# Patient Record
Sex: MALE | Race: WHITE
[De-identification: names, ages, dates, MRNs, and addresses within clinical notes are randomized per-mention and may not be internally consistent; named-entity substitution may affect disease eponyms.]

---

## 2022-01-19 ENCOUNTER — HOSPITAL ENCOUNTER (EMERGENCY)
Dept: HOSPITAL 63 - ER | Age: 57
Discharge: ANOTHER HOSPITAL | End: 2022-01-19
Payer: COMMERCIAL

## 2022-01-19 VITALS — SYSTOLIC BLOOD PRESSURE: 129 MMHG | DIASTOLIC BLOOD PRESSURE: 75 MMHG

## 2022-01-19 VITALS — BODY MASS INDEX: 40.48 KG/M2 | WEIGHT: 257.94 LBS | HEIGHT: 67 IN

## 2022-01-19 DIAGNOSIS — G93.41: ICD-10-CM

## 2022-01-19 DIAGNOSIS — Z88.8: ICD-10-CM

## 2022-01-19 DIAGNOSIS — G89.29: ICD-10-CM

## 2022-01-19 DIAGNOSIS — Z88.5: ICD-10-CM

## 2022-01-19 DIAGNOSIS — K52.9: Primary | ICD-10-CM

## 2022-01-19 LAB
ACETAMIN: < 2 MCG/ML (ref 10–30)
ALBUMIN SERPL-MCNC: 2.9 G/DL (ref 3.4–5)
ALP SERPL-CCNC: 128 U/L (ref 46–116)
ALT SERPL-CCNC: 31 U/L (ref 16–63)
AMPHETAMINE/METHAMPHETAMINE: (no result)
ANION GAP SERPL CALC-SCNC: 8 MMOL/L (ref 6–14)
APTT PPP: YELLOW S
AST SERPL-CCNC: 33 U/L (ref 15–37)
BACTERIA #/AREA URNS HPF: 0 /HPF
BARBITURATES UR-MCNC: (no result) UG/ML
BASOPHILS # BLD AUTO: 0 X10^3/UL (ref 0–0.2)
BASOPHILS NFR BLD: 1 % (ref 0–3)
BENZODIAZ UR-MCNC: (no result) UG/L
BILIRUB DIRECT SERPL-MCNC: 0.3 MG/DL (ref 0–0.2)
BILIRUB SERPL-MCNC: 0.9 MG/DL (ref 0.2–1)
BILIRUB UR QL STRIP: (no result)
CA-I SERPL ISE-MCNC: 13 MG/DL (ref 8–26)
CALCIUM SERPL-MCNC: 8.6 MG/DL (ref 8.5–10.1)
CANNABINOIDS UR-MCNC: (no result) UG/L
CHLORIDE SERPL-SCNC: 109 MMOL/L (ref 98–107)
CO2 SERPL-SCNC: 25 MMOL/L (ref 21–32)
COCAINE UR-MCNC: (no result) NG/ML
CREAT SERPL-MCNC: 0.8 MG/DL (ref 0.7–1.3)
EOSINOPHIL NFR BLD: 0.1 X10^3/UL (ref 0–0.7)
EOSINOPHIL NFR BLD: 3 % (ref 0–3)
ERYTHROCYTE [DISTWIDTH] IN BLOOD BY AUTOMATED COUNT: 14.1 % (ref 11.5–14.5)
ETHANOL SERPL-MCNC: < 10 MG/DL (ref 0–10)
FIBRINOGEN PPP-MCNC: (no result) MG/DL
GFR SERPLBLD BASED ON 1.73 SQ M-ARVRAT: 100 ML/MIN
GLUCOSE SERPL-MCNC: 124 MG/DL (ref 70–99)
GLUCOSE UR STRIP-MCNC: (no result) MG/DL
HCT VFR BLD CALC: 38.7 % (ref 39–53)
HGB BLD-MCNC: 12.9 G/DL (ref 13–17.5)
INFLUENZA A PATIENT: NEGATIVE
INFLUENZA B PATIENT: NEGATIVE
LYMPHOCYTES # BLD: 0.6 X10^3/UL (ref 1–4.8)
LYMPHOCYTES NFR BLD AUTO: 19 % (ref 24–48)
MCH RBC QN AUTO: 35 PG (ref 25–35)
MCHC RBC AUTO-ENTMCNC: 33 G/DL (ref 31–37)
MCV RBC AUTO: 105 FL (ref 79–100)
METHADONE SERPL-MCNC: (no result) NG/ML
MONO #: 0.3 X10^3/UL (ref 0–1.1)
MONOCYTES NFR BLD: 10 % (ref 0–9)
NEUT #: 2.3 X10^3UL (ref 1.8–7.7)
NEUTROPHILS NFR BLD AUTO: 68 % (ref 31–73)
NITRITE UR QL STRIP: (no result)
OPIATES UR-MCNC: (no result) NG/ML
PCP SERPL-MCNC: (no result) MG/DL
PLATELET # BLD AUTO: 87 X10^3/UL (ref 140–400)
POTASSIUM SERPL-SCNC: 3.5 MMOL/L (ref 3.5–5.1)
PROT SERPL-MCNC: 6.3 G/DL (ref 6.4–8.2)
RBC # BLD AUTO: 3.7 X10^6/UL (ref 4.3–5.7)
RBC #/AREA URNS HPF: (no result) /HPF (ref 0–2)
SALIC: < 2.8 MG/DL (ref 2.8–20)
SODIUM SERPL-SCNC: 142 MMOL/L (ref 136–145)
SP GR UR STRIP: 1.02
SQUAMOUS #/AREA URNS LPF: (no result) /LPF
UROBILINOGEN UR-MCNC: >=8 MG/DL
WBC # BLD AUTO: 3.4 X10^3/UL (ref 4–11)
WBC #/AREA URNS HPF: (no result) /HPF (ref 0–4)

## 2022-01-19 PROCEDURE — 80076 HEPATIC FUNCTION PANEL: CPT

## 2022-01-19 PROCEDURE — G0480 DRUG TEST DEF 1-7 CLASSES: HCPCS

## 2022-01-19 PROCEDURE — 84145 PROCALCITONIN (PCT): CPT

## 2022-01-19 PROCEDURE — 84484 ASSAY OF TROPONIN QUANT: CPT

## 2022-01-19 PROCEDURE — 96366 THER/PROPH/DIAG IV INF ADDON: CPT

## 2022-01-19 PROCEDURE — 87428 SARSCOV & INF VIR A&B AG IA: CPT

## 2022-01-19 PROCEDURE — 85025 COMPLETE CBC W/AUTO DIFF WBC: CPT

## 2022-01-19 PROCEDURE — 80307 DRUG TEST PRSMV CHEM ANLYZR: CPT

## 2022-01-19 PROCEDURE — 80329 ANALGESICS NON-OPIOID 1 OR 2: CPT

## 2022-01-19 PROCEDURE — 85730 THROMBOPLASTIN TIME PARTIAL: CPT

## 2022-01-19 PROCEDURE — 87040 BLOOD CULTURE FOR BACTERIA: CPT

## 2022-01-19 PROCEDURE — 70450 CT HEAD/BRAIN W/O DYE: CPT

## 2022-01-19 PROCEDURE — 36415 COLL VENOUS BLD VENIPUNCTURE: CPT

## 2022-01-19 PROCEDURE — 99285 EMERGENCY DEPT VISIT HI MDM: CPT

## 2022-01-19 PROCEDURE — 93005 ELECTROCARDIOGRAM TRACING: CPT

## 2022-01-19 PROCEDURE — 96365 THER/PROPH/DIAG IV INF INIT: CPT

## 2022-01-19 PROCEDURE — 71260 CT THORAX DX C+: CPT

## 2022-01-19 PROCEDURE — 74177 CT ABD & PELVIS W/CONTRAST: CPT

## 2022-01-19 PROCEDURE — 82140 ASSAY OF AMMONIA: CPT

## 2022-01-19 PROCEDURE — 85610 PROTHROMBIN TIME: CPT

## 2022-01-19 PROCEDURE — 81001 URINALYSIS AUTO W/SCOPE: CPT

## 2022-01-19 PROCEDURE — 96361 HYDRATE IV INFUSION ADD-ON: CPT

## 2022-01-19 PROCEDURE — 80048 BASIC METABOLIC PNL TOTAL CA: CPT

## 2022-01-19 PROCEDURE — 83605 ASSAY OF LACTIC ACID: CPT

## 2022-01-19 NOTE — EKG
[facility] [address], [place] [address]

Test Date:    2022               Test Time:    10:46:04

Pat Name:     CARIDAD COLMENARES           Department:   

Patient ID:   [id number]           Room:          

Gender:       M                        Technician:   PETER

:          1965               Requested By: ALDA KRISHNAMURTHY

Order Number: [id number]            Reading MD:   Evan Arreola MD

                                 Measurements

Intervals                              Axis          

Rate:         109                      P:            249

HI:           134                      QRS:          15

QRSD:         98                       T:            26

QT:           368                                    

QTc:          497                                    

                           Interpretive Statements

SINUS TACHYCARDIA

Electronically Signed On 2022 20:39:25 CST by Evan Arreola MD

## 2022-01-19 NOTE — RAD
CT CHEST+ABD+PELVIS W



History: Altered mental status. Nausea vomiting. Cough.



Technique: CT of the chest, abdomen and pelvis were performed with intravenous contrast. Coronal and 
sagittal reconstructions were performed. Repeat images of the abdomen and pelvis were obtained due to
 artifact.



Exposure: One or more of the following individualized dose reduction techniques were utilized for thi
s examination:  

1. Automated exposure control  

2. Adjustment of the mA and/or kV according to patient size  

3. Use of iterative reconstruction technique.



Comparison: None



Findings: 

Chest: No pathologic lymphadenopathy. No aortic aneurysm or dissection. No consolidation or pleural e
ffusion. No pneumothorax. Mild gynecomastia. Numerous distal paraesophageal varices. Mild esophageal 
wall thickening.



6 mm left upper lobe pulmonary nodule (series 4 image 67).



Abdomen and pelvis: Degraded evaluation initially due to overlying metallic artifact. Repeat images w
ere obtained of the abdomen and pelvis.



 Heterogeneous nodular morphology of the liver. Ill-defined hypodense lesion within the right hepatic
 lobe measures 1.9 x 1.8 cm (on repeat axial image 42). Portal vein appears patent although evaluatio
n is degraded. Recanalized periumbilical vein. Multiple upper abdominal varices. Distal paraesophagea
l varices. Potential splenorenal shunt. The spleen is enlarged measures 16.3 cm.



Cholelithiasis. No biliary ductal dilatation. The adrenal glands and pancreas are unremarkable. No re
nal calculus. No hydronephrosis. Contrast noted within the urinary bladder from prior contrast study.




Mild ascending colonic wall thickening with adjacent pericolonic inflammatory changes. Normal appendi
x. No evidence of bowel obstruction. Small upper abdominal lymph nodes. No ascites.



Bones: Chronic right-sided rib fractures. Postop changes cervical spine. Grade 1 anterolisthesis L5 o
n S1 due to bilateral pars defects. Moderate multilevel lumbar spondylosis with canal and neuroforami
nal narrowing most prominent L3-L4 and L4-5.



Impression:

Chest CT:

1.  Mild esophageal wall thickening, may relate to nondistention. Correlate for esophagitis.

2.  Numerous distal paraesophageal varices due to chronic liver disease.

3.  Left upper lobe pulmonary nodule. Recommend 6 month follow-up chest CT without contrast.



Abdomen and pelvis CT:

1.  Mild ascending colonic wall thickening with adjacent inflammatory changes, may represent infectio
us or inflammatory colitis.

2.  Hepatic cirrhosis with ill-defined hypodense lesion, can be seen with hepatocellular carcinoma. R
ecommend further clinical evaluation and MRI with and without contrast to further evaluate.

3.  Sequelae of portal hypertension with multiple upper abdominal varices with potential splenorenal 
shunt and splenomegaly.

4.  Cholelithiasis.



Electronically signed by: Joe Winslow DO (1/19/2022 12:06 PM) [id number]

## 2022-01-19 NOTE — PHYS DOC
Past History


Additional Past Medical Histor:  chronic pain, carpal tunnel, mild cognitive 

impairment, myelopathy


Past Surgical History:  Other


Additional Past Surgical Histo:  cerivcal fusion


Alcohol Use:  None





Adult General


Chief Complaint


Chief Complaint:  ALTERED MENTAL STATUS





HPI


HPI


The patient is a 56-year-old male with a history of hypertension.  He is a 

federal prisoner and has been in a local nursing home under guard for 

rehabilitation after cervical spinal fusion surgery more than a month ago.





Over the past several days patient has had nausea, vomiting and diarrhea.  

Unclear exactly how much emesis and diarrhea he has had as he is unable to 

supply history himself and the halfway guards here with him work shifts and can 

only tell me what happened earlier today since the start of their shift.  

Evidently yesterday patient was sent to [facility] for emergency 

department evaluation; labs and a CT scan of the abdomen and pelvis were 

reportedly without evidence of acute process; patient was diagnosed with 

gastroenteritis and discharged home.





Today nursing home staff noticed that the patient was very lethargic.  At 

baseline he is ambulatory and alert and oriented x4 but today he is somnolent 

and only oriented to himself.  They referred him for repeat emergency department

evaluation.  Upon initial evaluation here in the emergency department patient is

oriented to self only, very sleepy and moves all extremities equally and 

purposefully.  Mucous membranes appear dry.  He does not appear to have any 

discomfort when his abdomen is palpated.  He is in no acute distress and vital 

signs are appropriate here.  Blood glucose is appropriate as well.





Review of Systems


Review of Systems


A 12 point review of systems was completed and was negative except where noted 

in HPI above.





Current Medications


Current Medications





Current Medications








 Medications


  (Trade)  Dose


 Ordered  Sig/Abel  Start Time


 Stop Time Status Last Admin


Dose Admin


 


 Iohexol


  (Omnipaque 300


 Mg/ml)  75 ml  1X  ONCE  1/19/22 10:45


 1/19/22 10:46 DC  





 


 Lactated Ringer's  1,000 ml @ 


 999 mls/hr  1X  ONCE  1/19/22 10:45


 1/19/22 11:45  1/19/22 10:45


999 MLS/HR


 


 Piperacillin Sod/


 Tazobactam Sod


 4.5 gm/Sodium


 Chloride  50 ml @ 


 100 mls/hr  1X  ONCE  1/19/22 10:45


 1/19/22 11:14 DC  





 


 Sodium Chloride  1,000 ml @ 


 1,000 mls/hr  1X  ONCE  1/19/22 09:45


 1/19/22 10:44 DC 1/19/22 09:39


1,000 MLS/HR











Allergies


Allergies





Allergies








Coded Allergies Type Severity Reaction Last Updated Verified


 


  acetaminophen Allergy Unknown  1/19/22 Yes


 


  hydrocodone Allergy Unknown  1/19/22 Yes











Physical Exam


Physical Exam


56-year-old male appearing nontoxic and in no acute distress.  Head is 

normocephalic and atraumatic.  Neck is supple and nontender.  Oropharynx is 

tacky.  Lungs are clear to auscultation at all stations.  There is a normal S1 

and S2 without rubs or gallops and capillary refill is appropriate, less than 2 

seconds globally.  Abdomen is soft, nontender and nondistended.  Skin is warm 

and dry without cyanosis, clubbing or edema.  Psychiatrically, the patient 

demonstrates appropriate mood and affect and is alert.  Evaluation of the 

extremities reveals BUEs and BLEs neurovascularly intact distally with strength 

5 out of 5, sensation intact light touch in all nerve distributions, radial, DP 

and PT pulses 2+ equal bilaterally, capillary refill less than 2 seconds, hands 

and feet warm and well-perfused.  No dependent peripheral edema distally.  No 

calf tenderness or swelling bilaterally.  Homans test is negative bilaterally.  

Neurologically, cranial nerves II to XII are intact and there are no 

lateralizing deficits seen.  Speech is normal.  Language is normal.  

Coordination is normal.  There is no dysmetria with finger-nose or heel-to-shin 

bilaterally.  Strength 5-5 at all joints of bilateral upper and lower 

extremities.  Sensation is intact light touch in bilateral upper and lower 

extremities.  Patient ambulates with a narrow, steady, non-ataxic gait here in 

the emergency department and is alert and oriented x4.





Current Patient Data


Vital Signs





                                   Vital Signs








  Date Time  Temp Pulse Resp B/P (MAP) Pulse Ox O2 Delivery O2 Flow Rate FiO2


 


1/19/22 09:34 97.5 81 16 137/80 (99) 97 Room Air  








Lab Results





                                Laboratory Tests








Test


 1/19/22


09:20 1/19/22


09:26 1/19/22


10:37


 


White Blood Count


 3.4 x10^3/uL


(4.0-11.0)  L 


 





 


Red Blood Count


 3.70 x10^6/uL


(4.30-5.70)  L 


 





 


Hemoglobin


 12.9 g/dL


(13.0-17.5)  L 


 





 


Hematocrit


 38.7 %


(39.0-53.0)  L 


 





 


Mean Corpuscular Volume


 105 fL


()  H 


 





 


Mean Corpuscular Hemoglobin 35 pg (25-35)    


 


Mean Corpuscular Hemoglobin


Concent 33 g/dL


(31-37) 


 





 


Red Cell Distribution Width


 14.1 %


(11.5-14.5) 


 





 


Platelet Count


 87 x10^3/uL


(140-400)  L 


 





 


Neutrophils (%) (Auto) 68 % (31-73)    


 


Lymphocytes (%) (Auto) 19 % (24-48)  L  


 


Monocytes (%) (Auto) 10 % (0-9)  H  


 


Eosinophils (%) (Auto) 3 % (0-3)    


 


Basophils (%) (Auto) 1 % (0-3)    


 


Neutrophils # (Auto)


 2.3 x10^3uL


(1.8-7.7) 


 





 


Lymphocytes # (Auto)


 0.6 x10^3/uL


(1.0-4.8)  L 


 





 


Monocytes # (Auto)


 0.3 x10^3/uL


(0.0-1.1) 


 





 


Eosinophils # (Auto)


 0.1 x10^3/uL


(0.0-0.7) 


 





 


Basophils # (Auto)


 0.0 x10^3/uL


(0.0-0.2) 


 





 


Sodium Level


 142 mmol/L


(136-145) 


 





 


Potassium Level


 3.5 mmol/L


(3.5-5.1) 


 





 


Chloride Level


 109 mmol/L


()  H 


 





 


Carbon Dioxide Level


 25 mmol/L


(21-32) 


 





 


Anion Gap 8 (6-14)    


 


Blood Urea Nitrogen


 13 mg/dL


(8-26) 


 





 


Creatinine


 0.8 mg/dL


(0.7-1.3) 


 





 


Estimated GFR


(Cockcroft-Gault) 100.0  


 


 





 


Glucose Level


 124 mg/dL


(70-99)  H 


 





 


Lactic Acid Level


 1.4 mmol/L


(0.4-2.0) 


 





 


Calcium Level


 8.6 mg/dL


(8.5-10.1) 


 





 


Total Bilirubin


 0.9 mg/dL


(0.2-1.0) 


 





 


Direct Bilirubin


 0.3 mg/dL


(0.0-0.2)  H 


 





 


Aspartate Amino Transferase


(AST) 33 U/L (15-37)


 


 





 


Alanine Aminotransferase (ALT)


 31 U/L (16-63)


 


 





 


Alkaline Phosphatase


 128 U/L


()  H 


 





 


Troponin I High Sensitivity


 48 ng/L (4-75)


 


 





 


Total Protein


 6.3 g/dL


(6.4-8.2)  L 


 





 


Albumin


 2.9 g/dL


(3.4-5.0)  L 


 





 


Salicylates Level


 < 2.8 mg/dL


(2.8-20.0)  L 


 





 


Salicylate Last Dose Date Unknown    


 


Salicylate Last Dose Time Unknown    


 


Acetaminophen Level


 < 2 mcg/mL


(10-30)  L 


 





 


Acetaminophen Last Dose Date Unknown    


 


Acetaminophen Last Dose Time Unknown    


 


Ethyl Alcohol Level


 < 10 mg/dL


(0-10) 


 





 


Influenza Type A (Rapid)


 


 Negative


(NEGATIVE) 





 


Influenza Type B (Rapid)


 


 Negative


(NEGATIVE) 





 


SARS-CoV-2 Antigen (Rapid)


 


 Negative


(NEGATIVE) 





 


Urine Opiates Screen   Neg (NEG)  


 


Urine Methadone Screen   Neg (NEG)  


 


Urine Barbiturates   Neg (NEG)  


 


Urine Phencyclidine Screen   Neg (NEG)  


 


Urine


Amphetamine/Methamphetamine 


 


 Neg (NEG)  





 


Urine Benzodiazepines Screen   Neg (NEG)  


 


Urine Cocaine Screen   Neg (NEG)  


 


Urine Cannabinoids Screen   Neg (NEG)  


 


Urine Ethyl Alcohol   Neg (NEG)  











EKG


EKG


[] Sinus rhythm, rate 97, no acute ST elevation or depression, , QRS 84, 

QTc 459, EP interpretation.  Nonischemic tracing, intervals appropriate.





Radiology/Procedures


Radiology/Procedures


CT CHEST+ABD+PELVIS W





History: Altered mental status. Nausea vomiting. Cough.





Technique: CT of the chest, abdomen and pelvis were performed with intravenous 

contrast. Coronal and sagittal reconstructions were performed. Repeat images of 

the abdomen and pelvis were obtained due to artifact.





Exposure: One or more of the following individualized dose reduction techniques 

were utilized for this examination:  


1. Automated exposure control  


2. Adjustment of the mA and/or kV according to patient size  


3. Use of iterative reconstruction technique.





Comparison: None





Findings: 


Chest: No pathologic lymphadenopathy. No aortic aneurysm or dissection. No 

consolidation or pleural effusion. No pneumothorax. Mild gynecomastia. Numerous 

distal paraesophageal varices. Mild esophageal wall thickening.





6 mm left upper lobe pulmonary nodule (series 4 image 67).





Abdomen and pelvis: Degraded evaluation initially due to overlying metallic 

artifact. Repeat images were obtained of the abdomen and pelvis.





 Heterogeneous nodular morphology of the liver. Ill-defined hypodense lesion 

within the right hepatic lobe measures 1.9 x 1.8 cm (on repeat axial image 42). 

Portal vein appears patent although evaluation is degraded. Recanalized 

periumbilical vein. Multiple upper abdominal varices. Distal paraesophageal 

varices. Potential splenorenal shunt. The spleen is enlarged measures 16.3 cm.





Cholelithiasis. No biliary ductal dilatation. The adrenal glands and pancreas 

are unremarkable. No renal calculus. No hydronephrosis. Contrast noted within 

the urinary bladder from prior contrast study.





Mild ascending colonic wall thickening with adjacent pericolonic inflammatory 

changes. Normal appendix. No evidence of bowel obstruction. Small upper 

abdominal lymph nodes. No ascites.





Bones: Chronic right-sided rib fractures. Postop changes cervical spine. Grade 1

 anterolisthesis L5 on S1 due to bilateral pars defects. Moderate multilevel 

lumbar spondylosis with canal and neuroforaminal narrowing most prominent L3-L4 

and L4-5.





Impression:


Chest CT:


1.  Mild esophageal wall thickening, may relate to nondistention. Correlate for 

esophagitis.


2.  Numerous distal paraesophageal varices due to chronic liver disease.


3.  Left upper lobe pulmonary nodule. Recommend 6 month follow-up chest CT 

without contrast.





Abdomen and pelvis CT:


1.  Mild ascending colonic wall thickening with adjacent inflammatory changes, 

may represent infectious or inflammatory colitis.


2.  Hepatic cirrhosis with ill-defined hypodense lesion, can be seen with he

patocellular carcinoma. Recommend further clinical evaluation and MRI with and 

without contrast to further evaluate.


3.  Sequelae of portal hypertension with multiple upper abdominal varices with 

potential splenorenal shunt and splenomegaly.


4.  Cholelithiasis.





Electronically signed by: Joe Winslow DO (1/19/2022 12:06 PM) [id number]














DICTATED AND SIGNED BY:     JOE WINSLOW DO


DATE:     01/19/22 1143





CC: ALDA KRISHNAMURTHY MD; PCP,NO ~MTH0 0























EXAMINATION: CT HEAD/BRAIN WO





CLINICAL HISTORY: Altered mental status.





TECHNIQUE: Serial axial images without IV contrast were obtained from the vertex

 to the foramen magnum. Artificial intelligence software analysis also performed

 utilizing [employer].





CT Dose Reduction Employed: One or more of the following individualized dose 

reduction techniques were utilized for this examination:  1. Automated exposure 

control  2. Adjustment of the mA and/or kV according to patient size  3. Use of 

iterative reconstruction technique.





COMPARISON: None








FINDINGS:   





Acute Change: No evidence of an acute infarct or other acute parenchymal 

process.


  


Hemorrhage: No evidence of acute intracranial hemorrhage.





Mass Lesion/Mass Effect: No evidence of intracranial mass or extraaxial fluid 

collection. No significant mass effect.


 


Chronic Change: Atherosclerotic calcification of the intracranial portion of the

 bilateral internal carotid arteries.





Parenchyma: Mild generalized volume loss. Parenchyma otherwise within normal 

limits for age.





Ventricles: Ventricular enlargement concordant with degree of parenchymal volume

 loss.





Paranasal Sinuses and Skull Base: Visualized paranasal sinuses clear. Visualized

 skull base and soft tissues unremarkable.








IMPRESSION:  





No evidence of acute intracranial abnormality.





Electronically signed by: Alexandre Fournier DO (1/19/2022 10:25 AM) [facility] HospitalFOURNIER














DICTATED AND SIGNED BY:     ALEXANDRE FOURNIER DO


DATE:     01/19/22 1023





CC: ALDA KRISHNAMURTHY MD; PCP,NO ~MTH0 0





Heart Score


C/O Chest Pain:  No


Risk Factors:


Risk Factors:  DM, Current or recent (<one month) smoker, HTN, HLP, family 

history of CAD, obesity.


Risk Scores:


Risk Factors:  DM, Current or recent (<one month) smoker, HTN, HLP, family histo

ry of CAD, obesity.





Course & Med Decision Making


Course & Med Decision Making


56-year-old gentleman presents encephalopathic after vomiting and diarrhea for 

several days.  Nothing lateralizing on his neurologic exam to suggest stroke or 

other acute neurologic process.  Ammonia level sent and pending given 

radiographic evidence of liver disease, but lower suspicion for hepatic 

encephalopathy.  Patient has received 2 L of resuscitation with crystalloid and 

empiric Zosyn after cultures were drawn.  After 2 L of IV fluids he has improved

 from a responsiveness standpoint and was actually able to ambulate to the 

bathroom with assistance.  He is still quite somnolent, but much better than at 

arrival.  Head CT negative.  Labs essentially negative.  Chest and abdominal CT 

scans with evidence of hepatic cirrhosis and esophageal varices.  There is also 

an ill-defined hepatic lesion suspicious for hepatocellular carcinoma which will

 need to be followed up on.  Acutely, patient appears to have what is probably 

an infectious colitis by CT imaging.  Requires admission but feel the patient 

does need a higher level of care than is available here at [facility].  Attempting to 

identify a transfer target at this time.





1345: No [facility] have bed availability.  Have located a bed at 

[facility] in [place], [place] where the patient is 

graciously accepted in transfer by Dr. Singh.





Karissa Disclaimer


Karissa Disclaimer


This electronic medical record was generated, in whole or in part, using a voice

 recognition dictation system.





Departure


Departure:


Impression:  


   Primary Impression:  


   Acute colitis


   Additional Impression:  


   Acute metabolic encephalopathy


Disposition:  [facility]


Condition:  GUARDED


Referrals:  


PCP,NO (PCP)





Problem Qualifiers











ALDA KRISHNAMURTHY MD                Jan 19, 2022 11:49

## 2022-01-19 NOTE — RAD
EXAMINATION: CT HEAD/BRAIN WO



CLINICAL HISTORY: Altered mental status.



TECHNIQUE: Serial axial images without IV contrast were obtained from the vertex to the foramen magnu
m. Artificial intelligence software analysis also performed utilizing [employer].



CT Dose Reduction Employed: One or more of the following individualized dose reduction techniques wer
e utilized for this examination:  1. Automated exposure control  2. Adjustment of the mA and/or kV ac
cording to patient size  3. Use of iterative reconstruction technique.



COMPARISON: None





FINDINGS:   



Acute Change: No evidence of an acute infarct or other acute parenchymal process.

  

Hemorrhage: No evidence of acute intracranial hemorrhage.



Mass Lesion/Mass Effect: No evidence of intracranial mass or extraaxial fluid collection. No signific
ant mass effect.

 

Chronic Change: Atherosclerotic calcification of the intracranial portion of the bilateral internal c
arotid arteries.



Parenchyma: Mild generalized volume loss. Parenchyma otherwise within normal limits for age.



Ventricles: Ventricular enlargement concordant with degree of parenchymal volume loss.



Paranasal Sinuses and Skull Base: Visualized paranasal sinuses clear. Visualized skull base and soft 
tissues unremarkable.





IMPRESSION:  



No evidence of acute intracranial abnormality.



Electronically signed by: Alexandre Dawson DO (1/19/2022 10:25 AM) [place]